# Patient Record
Sex: OTHER/UNKNOWN | Race: WHITE | URBAN - METROPOLITAN AREA
[De-identification: names, ages, dates, MRNs, and addresses within clinical notes are randomized per-mention and may not be internally consistent; named-entity substitution may affect disease eponyms.]

---

## 2020-01-27 ENCOUNTER — HOSPITAL ENCOUNTER (EMERGENCY)
Age: 25
Discharge: LEFT BEFORE DISCHARGE | End: 2020-01-27
Attending: EMERGENCY MEDICINE

## 2020-01-27 VITALS
SYSTOLIC BLOOD PRESSURE: 107 MMHG | DIASTOLIC BLOOD PRESSURE: 92 MMHG | RESPIRATION RATE: 17 BRPM | OXYGEN SATURATION: 95 % | HEART RATE: 104 BPM

## 2020-01-27 PROCEDURE — 99284 EMERGENCY DEPT VISIT MOD MDM: CPT

## 2020-01-27 NOTE — ED NOTES
Pt refused to answer all the triage questions before leaving. Instructed pt that is was very important to stay and be checked out and monitored, but pt continued to refused and jumped up and left.       Taye Naidu RN  01/27/20 0835

## 2020-01-27 NOTE — ED PROVIDER NOTES
[facility] ENCOUNTER        Pt Name: Mindi Thornton  MRN: [id number]  Darciegfjared 1995  Date of evaluation: 1/27/2020  Provider: Awa Cruz MD  PCP: No primary care provider on file. CHIEF COMPLAINT       Chief Complaint   Patient presents with    Drug Overdose       HISTORY OFPRESENT ILLNESS   (Location/Symptom, Timing/Onset, Context/Setting, Quality, Duration, Modifying Factors,Severity)  Note limiting factors. Mindi Thornton is a 30 y.o. adult presenting after suspected overdose. A car pulled up in front of the [address] [place] [address] emergency department, called for help due to overdose, and healthcare staff went to the parking lot to assist.  Patient was unresponsive, cyanotic, apneic but not pulseless. Nursing Notes were all reviewed and agreed with or any disagreements were addressed  in the HPI. REVIEW OF SYSTEMS    (2-9 systems for level 4, 10 or more for level 5)     Review of Systems   Unable to perform ROS: Patient unresponsive         PAST MEDICAL HISTORY   No past medical history on file. SURGICAL HISTORY   No past surgical history on file. CURRENTMEDICATIONS       Previous Medications    No medications on file       ALLERGIES     Patient has no allergy information on record. FAMILY HISTORY     No family history on file.        SOCIAL HISTORY       Social History     Socioeconomic History    Marital status: Unknown     Spouse name: Not on file    Number of children: Not on file    Years of education: Not on file    Highest education level: Not on file   Occupational History    Not on file   Social Needs    Financial resource strain: Not on file    Food insecurity:     Worry: Not on file     Inability: Not on file    Transportation needs:     Medical: Not on file     Non-medical: Not on file   Tobacco Use    Smoking status: Not on file   Substance and Sexual Activity    Alcohol use: Not on file    Drug use: Not on to give any information about what drugs he took. Declined any attempt at self harm. After he became more awake and alert, he attempted to leave and as he seemed to be close to baseline mental status, seemed to have decision-making capacity, feel that he is capable refusing at this time. He refuses further evaluation or treatment despite our attempts to explain risks up to and including further apnea, death, disability. FINAL IMPRESSION      1. Opioid overdose, accidental or unintentional, initial encounter ([facility] [address]          DISPOSITION/PLAN   DISPOSITION        PATIENT REFERRED TO:  No follow-up provider specified.     DISCHARGE MEDICATIONS:  New Prescriptions    No medications on file       DISCONTINUED MEDICATIONS:  Discontinued Medications    No medications on file                (Please note that portions of this note were completed with a voice recognition program.  Efforts were made to edit the dictations but occasionally words are mis-transcribed.)    Lisa Recinos MD (electronically signed)            Lisa Recinos MD  01/27/20 300 Mikel Sanz MD  01/27/20 2346